# Patient Record
Sex: MALE | Race: ASIAN | URBAN - METROPOLITAN AREA
[De-identification: names, ages, dates, MRNs, and addresses within clinical notes are randomized per-mention and may not be internally consistent; named-entity substitution may affect disease eponyms.]

---

## 2017-10-03 ENCOUNTER — OFFICE VISIT (OUTPATIENT)
Dept: INTERNAL MEDICINE CLINIC | Age: 32
End: 2017-10-03

## 2017-10-03 VITALS
WEIGHT: 187.2 LBS | BODY MASS INDEX: 26.21 KG/M2 | DIASTOLIC BLOOD PRESSURE: 72 MMHG | HEART RATE: 80 BPM | TEMPERATURE: 98.3 F | HEIGHT: 71 IN | SYSTOLIC BLOOD PRESSURE: 117 MMHG | OXYGEN SATURATION: 98 %

## 2017-10-03 DIAGNOSIS — Z76.89 ESTABLISHING CARE WITH NEW DOCTOR, ENCOUNTER FOR: ICD-10-CM

## 2017-10-03 DIAGNOSIS — F41.9 ANXIETY: Primary | ICD-10-CM

## 2017-10-03 PROCEDURE — 99203 OFFICE O/P NEW LOW 30 MIN: CPT | Performed by: NURSE PRACTITIONER

## 2017-10-03 RX ORDER — CITALOPRAM 10 MG/1
10 TABLET ORAL DAILY
Qty: 30 TABLET | Refills: 3 | Status: SHIPPED | OUTPATIENT
Start: 2017-10-03 | End: 2017-10-10

## 2017-10-03 NOTE — MR AVS SNAPSHOT
After Visit Summary             HANDY Lieberman   10/3/2017 1:15 PM   Office Visit    Description:  Male : 1985   Provider:  Judy Renee NP   Department:  [facility]              Your Follow-Up and Future Appointments         Below is a list of your follow-up and future appointments. This may not be a complete list as you may have made appointments directly with providers that we are not aware of or your providers may have made some for you. Please call your providers to confirm appointments. It is important to keep your appointments. Please bring your current insurance card, photo ID, co-pay, and all medication bottles to your appointment. If self-pay, payment is expected at the time of service. Information from Your Visit        Department     Name Address Phone Fax    [facility] [address] [place], [place] [id number] [phone]      You Were Seen for:         Comments    Anxiety   [[id number]]         Vital Signs     Blood Pressure Pulse Temperature Height Weight Oxygen Saturation    142/88 (Site: Left Arm, Position: Sitting) 80 98.3 °F (36.8 °C) (Oral) 5' 11\" (1.803 m) 187 lb 3.2 oz (84.9 kg) 98%    Body Mass Index Smoking Status                26.11 kg/m2 Never Smoker          Additional Information about your Body Mass Index (BMI)           Your BMI as listed above is considered overweight (25.0-29.9). BMI is an estimate of body fat, calculated from your height and weight. The higher your BMI, the greater your risk of heart disease, high blood pressure, type 2 diabetes, stroke, gallstones, arthritis, sleep apnea, and certain cancers. BMI is not perfect. It may overestimate body fat in athletes and people who are more muscular. If your body fat is high you can improve your BMI by decreasing your calorie intake and becoming more physically active.      Learn more at: [employer] Today's Medication Changes          These changes are accurate as of: 10/3/17  2:03 PM.  If you have any questions, ask your nurse or doctor. START taking these medications           citalopram 10 MG tablet   Commonly known as:  CELEXA   Instructions: Take 1 tablet by mouth daily   Quantity:  30 tablet   Refills:  3   Started by:  Jeri Serna NP            Where to Get Your Medications      These medications were sent to [facility], [address] [place] [address], [address] [place]    Hours:  24-hours Phone:  [phone]     citalopram 10 MG tablet               Your Current Medications Are              citalopram (CELEXA) 10 MG tablet Take 1 tablet by mouth daily    DILTIAZEM HCL APPLY TO ANAL AREA TWICE DAILY FOR FOUR (4) WEEKS (PLEASE REORDER 2 BUSINESS DAYS IN ADVANCE)    albuterol (PROVENTIL HFA;VENTOLIN HFA) 108 (90 BASE) MCG/ACT inhaler USE 1 PUFF BY MOUTH EVERY 6 HOURS AS NEEDED FOR ASTHMA      Allergies              Shellfish-derived Products Itching, Swelling         Additional Information        Basic Information     Date Of Birth Sex Race Ethnicity Preferred Language    1985 Male  Non-/Non  English      Problem List as of 10/3/2017                 ADHD (attention deficit hyperactivity disorder)    Asthma with acute exacerbation      Immunizations as of 10/3/2017     Name Date    DTP 7/14/1992, 5/1/1991, 11/1/1988, [birth year]    Hepatitis B 4/8/1996, 11/15/1995, 9/22/1995    Hib, unspecified foumulation 11/1/1988    MMR 9/29/1993, 8/22/1988    OPV 7/14/1992, 5/1/1991, 11/1/1988, 1985, 1985    PPD Test 5/1/1991, 5/2/1990, 6/1/1989, 5/16/1988    Td 6/16/1999      Preventive Care        Date Due    HIV screening is recommended for all people regardless of risk factors  aged 15-65 years at least once (lifetime) who have never been HIV tested.  5/27/2002 Tetanus Combination Vaccine (2 - Tdap) 5/27/2006    Pneumococcal Vaccine - Pneumovax for adults aged 19-64 years with: chronic heart disease, chronic lung disease, diabetes mellitus, alcoholism, chronic liver disease, or cigarette smoking. (1 of 1 - PPSV23) 5/27/2006    Yearly Flu Vaccine (1) 9/1/2017            [facility] Signup           Thank you for enrolling in [address] Please follow the instructions below to securely access your online medical record. [facility] allows you to send messages to your doctor, view your test results, renew your prescriptions, schedule appointments, and more. How Do I Sign Up? 1. In your Internet browser, go to [email or web address]  2. Click on the Sign Up Now link in the Sign In box. You will see the New Member Sign Up page. 3. Enter your [facility] Access Code exactly as it appears below. You will not need to use this code after youve completed the sign-up process. If you do not sign up before the expiration date, you must request a new code. [facility] Access Code: [email or web address]  Expires: 12/2/2017  2:03 PM    4. Enter your Social Security Number ([id number]) and Date of Birth (mm/dd/yyyy) as indicated and click Submit. You will be taken to the next sign-up page. 5. Create a [facility]ately ID. This will be your [facility] login ID and cannot be changed, so think of one that is secure and easy to remember. 6. Create a [facility] password. You can change your password at any time. 7. Enter your Password Reset Question and Answer. This can be used at a later time if you forget your password. 8. Enter your e-mail address. You will receive e-mail notification when new information is available in [address] 9. Click Sign Up. You can now view your medical record. Additional Information  If you have questions, please contact the physician practice where you receive care. Remember, [facility] is NOT to be used for urgent needs. For medical emergencies, dial 911. For questions regarding your [employer] account call [phone]. If you have a clinical question, please call your doctor's office.

## 2017-10-03 NOTE — PROGRESS NOTES
membrane, external ear and ear canal normal.   Mouth/Throat: Oropharynx is clear and moist.   Eyes: Pupils are equal, round, and reactive to light. Cardiovascular: Normal rate, regular rhythm and normal heart sounds. Pulmonary/Chest: Effort normal and breath sounds normal.   Lymphadenopathy:     He has no cervical adenopathy. Neurological: He is alert. Skin: Skin is warm and dry. Assessment:      1. Anxiety             Plan:      Begin celexa  Discussed medication, potential side effects  He is young, healthy, healthy weight and diet. I believe his bp issues are caused by anxiety/stress vs a true essential hypertension.    He agrees with plan of care  RTC 8 weeks for medication follow up

## 2017-10-06 ENCOUNTER — TELEPHONE (OUTPATIENT)
Dept: INTERNAL MEDICINE CLINIC | Age: 32
End: 2017-10-06

## 2017-10-06 NOTE — TELEPHONE ENCOUNTER
Called and spoke with pt. He mentioned that he has some concerns after doing his research. He said that with the citalopram, he said that he doesn't want to be on a medication that his body will have to be weaned off on and depend on. He said he would like to try something that he can take as PRN. He mentioned something like Xanax. I did inform pt that we do not prescribe that in office, that would be something that he would have to be referred out for as in a psych. He said he didn't mean xanax he was using that as an example of something to take PRN. He states he would like to speak with DN. I mentioned that I would send message back to her but it is hard during the day to talk to pt's personally because she is in clinic. I asked if he doesn't receive a call today would sometime early next week be ok, pt stated yes.

## 2017-10-10 RX ORDER — HYDROXYZINE PAMOATE 25 MG/1
25 CAPSULE ORAL 3 TIMES DAILY PRN
Qty: 30 CAPSULE | Refills: 3 | Status: SHIPPED | OUTPATIENT
Start: 2017-10-10

## 2017-10-10 RX ORDER — HYDROXYZINE PAMOATE 25 MG/1
25 CAPSULE ORAL 3 TIMES DAILY PRN
Qty: 30 CAPSULE | Refills: 3 | Status: SHIPPED | OUTPATIENT
Start: 2017-10-10 | End: 2017-10-10 | Stop reason: CLARIF

## 2017-10-10 NOTE — TELEPHONE ENCOUNTER
Called to speak with patient regarding celexa. He has decided not to take the celexa because he doesn't want to take a daily med. States his anxiety only happens maybe once a week. He doesn't want to begin to feel dependent on a medication. Wants something he can take as needed. Discussed vistaril. He is open to trying this medication. Will send to pharmacy.  Advised patient to not drive or operate heavy machinery while taking this